# Patient Record
Sex: MALE | Race: WHITE | NOT HISPANIC OR LATINO | URBAN - METROPOLITAN AREA
[De-identification: names, ages, dates, MRNs, and addresses within clinical notes are randomized per-mention and may not be internally consistent; named-entity substitution may affect disease eponyms.]

---

## 2023-11-03 ENCOUNTER — OFFICE VISIT (OUTPATIENT)
Dept: URBAN - METROPOLITAN AREA CLINIC 96 | Facility: CLINIC | Age: 58
End: 2023-11-03
Payer: COMMERCIAL

## 2023-11-03 VITALS
BODY MASS INDEX: 21.82 KG/M2 | DIASTOLIC BLOOD PRESSURE: 76 MMHG | HEART RATE: 80 BPM | TEMPERATURE: 98.2 F | WEIGHT: 144 LBS | HEIGHT: 68 IN | SYSTOLIC BLOOD PRESSURE: 110 MMHG

## 2023-11-03 DIAGNOSIS — R10.9 ABDOMINAL DISTRESS: ICD-10-CM

## 2023-11-03 DIAGNOSIS — K58.2 IRRITABLE BOWEL SYNDROME WITH BOTH CONSTIPATION AND DIARRHEA: ICD-10-CM

## 2023-11-03 PROBLEM — 10743008: Status: ACTIVE | Noted: 2023-11-03

## 2023-11-03 PROCEDURE — 99204 OFFICE O/P NEW MOD 45 MIN: CPT | Performed by: INTERNAL MEDICINE

## 2023-11-03 NOTE — HPI-TODAY'S VISIT:
This patient was referred by Dr. Kwame Horton and Dr. Adelfo Fall for an evaluation of abd distress.  A copy of this will be sent to the referring provider.  58 y.o. WM, partner, management of IT Saw Dr. Fall re LPR -- tough yr and half Tends to control reflux w/ diet Nothing major Gas like most people IBS in the family which has had 1.5 yrs ago - [place] - big piece of chicken - completely full in chest - couldn't eat anymore - lasted 1 week Then had pains moving all over the place Saw a gastro - blood tests, ultrasound, CT scan - nothing found - did endoscopy and colonoscopy - single polyp and esophagitis - did PPIs - eat fiber - normal stuff; next 2 months pains went away - alot was stress  Relapse - different type of pain - not as bad - went back to him - normal story - are you taking your fiber, other things Foggy head, lots of stress - went away 3 months ago - sore throat - no improvement w/ abx - saw 1 ENT and now Rosalva Watched samra  Done a lot of research Tracks methane at home Wants to find root cause Periods of constipation - 3 days no bm - would have to use enema One episode of diarrhea Last month has been good Nico Kong - GI MD

## 2023-11-07 ENCOUNTER — DASHBOARD ENCOUNTERS (OUTPATIENT)
Age: 58
End: 2023-11-07